# Patient Record
Sex: MALE | Race: WHITE | Employment: OTHER
[De-identification: names, ages, dates, MRNs, and addresses within clinical notes are randomized per-mention and may not be internally consistent; named-entity substitution may affect disease eponyms.]

---

## 2024-04-07 ENCOUNTER — APPOINTMENT (OUTPATIENT)
Facility: HOSPITAL | Age: 69
End: 2024-04-07
Payer: MEDICARE

## 2024-04-07 ENCOUNTER — HOSPITAL ENCOUNTER (EMERGENCY)
Facility: HOSPITAL | Age: 69
Discharge: HOME | End: 2024-04-07
Payer: MEDICARE

## 2024-04-07 VITALS
TEMPERATURE: 97 F | HEIGHT: 67 IN | WEIGHT: 178 LBS | BODY MASS INDEX: 27.94 KG/M2 | RESPIRATION RATE: 22 BRPM | SYSTOLIC BLOOD PRESSURE: 189 MMHG | DIASTOLIC BLOOD PRESSURE: 78 MMHG | OXYGEN SATURATION: 98 % | HEART RATE: 70 BPM

## 2024-04-07 DIAGNOSIS — M19.039 JOINT INFLAMMATION OF WRIST: Primary | ICD-10-CM

## 2024-04-07 DIAGNOSIS — M25.532 LEFT WRIST PAIN: ICD-10-CM

## 2024-04-07 PROCEDURE — 99284 EMERGENCY DEPT VISIT MOD MDM: CPT

## 2024-04-07 PROCEDURE — 96372 THER/PROPH/DIAG INJ SC/IM: CPT

## 2024-04-07 PROCEDURE — 73110 X-RAY EXAM OF WRIST: CPT

## 2024-04-07 PROCEDURE — 6370000000 HC RX 637 (ALT 250 FOR IP): Performed by: PHYSICIAN ASSISTANT

## 2024-04-07 PROCEDURE — 6360000002 HC RX W HCPCS: Performed by: PHYSICIAN ASSISTANT

## 2024-04-07 RX ORDER — MORPHINE SULFATE 10 MG/ML
10 INJECTION, SOLUTION INTRAMUSCULAR; INTRAVENOUS
Status: COMPLETED | OUTPATIENT
Start: 2024-04-07 | End: 2024-04-07

## 2024-04-07 RX ORDER — OXYCODONE HYDROCHLORIDE AND ACETAMINOPHEN 5; 325 MG/1; MG/1
2 TABLET ORAL
Status: COMPLETED | OUTPATIENT
Start: 2024-04-07 | End: 2024-04-07

## 2024-04-07 RX ORDER — KETOROLAC TROMETHAMINE 30 MG/ML
60 INJECTION, SOLUTION INTRAMUSCULAR; INTRAVENOUS
Status: COMPLETED | OUTPATIENT
Start: 2024-04-07 | End: 2024-04-07

## 2024-04-07 RX ORDER — PREDNISONE 50 MG/1
50 TABLET ORAL DAILY
Qty: 5 TABLET | Refills: 0 | Status: SHIPPED | OUTPATIENT
Start: 2024-04-07 | End: 2024-04-12

## 2024-04-07 RX ADMIN — KETOROLAC TROMETHAMINE 60 MG: 60 INJECTION, SOLUTION INTRAMUSCULAR at 15:36

## 2024-04-07 RX ADMIN — MORPHINE SULFATE 10 MG: 10 INJECTION INTRAVENOUS at 17:30

## 2024-04-07 RX ADMIN — PREDNISONE 50 MG: 10 TABLET ORAL at 17:30

## 2024-04-07 RX ADMIN — OXYCODONE HYDROCHLORIDE AND ACETAMINOPHEN 2 TABLET: 5; 325 TABLET ORAL at 15:36

## 2024-04-07 ASSESSMENT — PAIN SCALES - GENERAL
PAINLEVEL_OUTOF10: 10
PAINLEVEL_OUTOF10: 10

## 2024-04-07 NOTE — ED PROVIDER NOTES
EMERGENCY DEPARTMENT HISTORY & PHYSICAL EXAM    [facility] EMERGENCY DEPT  4/7/24, 3:05 PM EDT    Clinical Impression:  1. Joint inflammation of wrist    2. Left wrist pain        Assessment/Differential Diagnosis:     Ddx bony injury, joint injury, inflammatory process, infectious process, vascular process all considered    ED Course:   Initial assessment performed. The patients presenting problems have been discussed, and they are in agreement with the care plan formulated and outlined with them.  I have encouraged them to ask questions as they arise throughout their visit.    Patient comes to the ED with left wrist pain.  Patient states earlier this morning he felt some discomfort within his left wrist.  Patient states while driving his vehicle his pain escalated.  He states at this point he would not be able to drive due to his degree of wrist pain.  He denies any fever, chills or recent illness.  There is been no trauma or overuse.  He does not recall similar pain.  He denies history of gout.  He states the pain is a 10/10 and radiates to his left elbow but initially was just in his wrist.  Pain is much worse with any movement at his wrist, or palpation to the area.  Patient denies any weakness or numbness.  Patient denies fever, chills, headache, neck pain or stiffness.  No pain at the shoulder or elbow.  He is right-hand dominant.  Patient does have history of hypertension, GERD and does take lisinopril, omeprazole and baby aspirin daily.  Patient does have history of arthritis.  Patient does take 2 Percocet twice a day chronically for his arthritic pain.    Exam with older gentleman holding his left wrist gingerly.  He appears uncomfortable but nontoxic.  Initial vitals with elevated blood pressure, afebrile.  Heart regular rate and rhythm without murmur.  Lungs are clear to auscultation.  Neck is supple with normal range of motion, no pain with palpation.  Patient able to

## 2024-04-07 NOTE — ED TRIAGE NOTES
Pt c/o L Wrist pain and swelling with pain radiating to L elbow that started today after a 25min drive from [place]. Pt has a hx of L carpal tunnel repair and hx of arthritis. Pt denies trauma or fall. Pt is hypertensive in triage and appears uncomfortable with moaning, grimacing, and guarding noted.

## 2024-12-18 ENCOUNTER — ANESTHESIA EVENT (OUTPATIENT)
Facility: HOSPITAL | Age: 69
End: 2024-12-18
Payer: MEDICARE

## 2024-12-18 RX ORDER — SODIUM CHLORIDE, SODIUM LACTATE, POTASSIUM CHLORIDE, CALCIUM CHLORIDE 600; 310; 30; 20 MG/100ML; MG/100ML; MG/100ML; MG/100ML
INJECTION, SOLUTION INTRAVENOUS CONTINUOUS
Status: CANCELLED | OUTPATIENT
Start: 2024-12-18

## 2024-12-18 RX ORDER — SODIUM CHLORIDE 0.9 % (FLUSH) 0.9 %
5-40 SYRINGE (ML) INJECTION EVERY 12 HOURS SCHEDULED
Status: CANCELLED | OUTPATIENT
Start: 2024-12-18

## 2024-12-18 RX ORDER — LABETALOL HYDROCHLORIDE 5 MG/ML
10 INJECTION, SOLUTION INTRAVENOUS
Status: CANCELLED | OUTPATIENT
Start: 2024-12-18

## 2024-12-18 RX ORDER — IPRATROPIUM BROMIDE AND ALBUTEROL SULFATE 2.5; .5 MG/3ML; MG/3ML
1 SOLUTION RESPIRATORY (INHALATION)
Status: CANCELLED | OUTPATIENT
Start: 2024-12-18 | End: 2024-12-19

## 2024-12-18 RX ORDER — DROPERIDOL 2.5 MG/ML
0.62 INJECTION, SOLUTION INTRAMUSCULAR; INTRAVENOUS
Status: CANCELLED | OUTPATIENT
Start: 2024-12-18 | End: 2024-12-19

## 2024-12-18 RX ORDER — NALOXONE HYDROCHLORIDE 0.4 MG/ML
INJECTION, SOLUTION INTRAMUSCULAR; INTRAVENOUS; SUBCUTANEOUS PRN
Status: CANCELLED | OUTPATIENT
Start: 2024-12-18

## 2024-12-18 RX ORDER — SODIUM CHLORIDE 9 MG/ML
INJECTION, SOLUTION INTRAVENOUS PRN
Status: CANCELLED | OUTPATIENT
Start: 2024-12-18

## 2024-12-18 RX ORDER — DIPHENHYDRAMINE HYDROCHLORIDE 50 MG/ML
12.5 INJECTION INTRAMUSCULAR; INTRAVENOUS
Status: CANCELLED | OUTPATIENT
Start: 2024-12-18 | End: 2024-12-19

## 2024-12-18 RX ORDER — ONDANSETRON 2 MG/ML
4 INJECTION INTRAMUSCULAR; INTRAVENOUS
Status: CANCELLED | OUTPATIENT
Start: 2024-12-18 | End: 2024-12-19

## 2024-12-18 RX ORDER — SODIUM CHLORIDE 0.9 % (FLUSH) 0.9 %
5-40 SYRINGE (ML) INJECTION PRN
Status: CANCELLED | OUTPATIENT
Start: 2024-12-18

## 2024-12-18 ASSESSMENT — LIFESTYLE VARIABLES: SMOKING_STATUS: 1

## 2024-12-18 NOTE — ANESTHESIA PRE PROCEDURE
Department of Anesthesiology  Preprocedure Note       Name:  Kaden Campbell   Age:  69 y.o.  :  1955                                          MRN:  [id number]         Date:  2024      Surgeon: Surgeon(s):  Julio Ochoa MD    Procedure: Procedure(s):  EYE CATARACT EMULSIFICATION INTRAOCULAR LENS IMPLANT- RIGHT EYE \"SPEC POP\"    Medications prior to admission:   Prior to Admission medications    Medication Sig Start Date End Date Taking? Authorizing Provider   sildenafil (REVATIO) 20 MG tablet Take 1 tablet by mouth in the morning and at bedtime    Darcy Park MD   aspirin 81 MG EC tablet Take 1 tablet by mouth daily    Darcy Park MD   omeprazole (PRILOSEC) 20 MG delayed release capsule Take 1 capsule by mouth every morning    Darcy Park MD   tamsulosin (FLOMAX) 0.4 MG capsule Take 1 capsule by mouth every evening    Darcy Park MD   lisinopril (PRINIVIL;ZESTRIL) 40 MG tablet Take 1 tablet by mouth every evening    Darcy Park MD   NONFORMULARY Indications: Memory support vitamin    ProviderDarcy MD   Probiotic Product (PROBIOTIC BLEND PO) Take by mouth every morning    Darcy Park MD   DULoxetine (CYMBALTA) 60 MG extended release capsule Take 1 capsule by mouth every evening    Darcy Park MD   OXYCODONE ER PO Take by mouth in the morning and at bedtime    Darcy Park MD       Current medications:    No current facility-administered medications for this encounter.     Current Outpatient Medications   Medication Sig Dispense Refill    sildenafil (REVATIO) 20 MG tablet Take 1 tablet by mouth in the morning and at bedtime      aspirin 81 MG EC tablet Take 1 tablet by mouth daily      omeprazole (PRILOSEC) 20 MG delayed release capsule Take 1 capsule by mouth every morning      tamsulosin (FLOMAX) 0.4 MG capsule Take 1 capsule by mouth every evening      lisinopril (PRINIVIL;ZESTRIL) 40 MG tablet Take 1 tablet

## 2024-12-19 ENCOUNTER — HOSPITAL ENCOUNTER (OUTPATIENT)
Facility: HOSPITAL | Age: 69
Setting detail: OUTPATIENT SURGERY
Discharge: HOME | End: 2024-12-19
Attending: OPHTHALMOLOGY | Admitting: OPHTHALMOLOGY
Payer: MEDICARE

## 2024-12-19 ENCOUNTER — ANESTHESIA (OUTPATIENT)
Facility: HOSPITAL | Age: 69
End: 2024-12-19
Payer: MEDICARE

## 2024-12-19 VITALS
SYSTOLIC BLOOD PRESSURE: 151 MMHG | RESPIRATION RATE: 15 BRPM | WEIGHT: 163.7 LBS | OXYGEN SATURATION: 98 % | TEMPERATURE: 97.9 F | DIASTOLIC BLOOD PRESSURE: 72 MMHG | BODY MASS INDEX: 24.81 KG/M2 | HEART RATE: 62 BPM | HEIGHT: 68 IN

## 2024-12-19 PROCEDURE — 7100000011 HC PHASE II RECOVERY - ADDTL 15 MIN: Performed by: OPHTHALMOLOGY

## 2024-12-19 PROCEDURE — 7100000010 HC PHASE II RECOVERY - FIRST 15 MIN: Performed by: OPHTHALMOLOGY

## 2024-12-19 PROCEDURE — 6370000000 HC RX 637 (ALT 250 FOR IP): Performed by: OPHTHALMOLOGY

## 2024-12-19 PROCEDURE — 2720000010 HC SURG SUPPLY STERILE: Performed by: OPHTHALMOLOGY

## 2024-12-19 PROCEDURE — V2632 POST CHMBR INTRAOCULAR LENS: HCPCS | Performed by: OPHTHALMOLOGY

## 2024-12-19 PROCEDURE — 2709999900 HC NON-CHARGEABLE SUPPLY: Performed by: OPHTHALMOLOGY

## 2024-12-19 PROCEDURE — 3700000000 HC ANESTHESIA ATTENDED CARE: Performed by: OPHTHALMOLOGY

## 2024-12-19 PROCEDURE — 3600000002 HC SURGERY LEVEL 2 BASE: Performed by: OPHTHALMOLOGY

## 2024-12-19 PROCEDURE — 6360000002 HC RX W HCPCS: Performed by: OPHTHALMOLOGY

## 2024-12-19 PROCEDURE — 3600000012 HC SURGERY LEVEL 2 ADDTL 15MIN: Performed by: OPHTHALMOLOGY

## 2024-12-19 PROCEDURE — 2500000003 HC RX 250 WO HCPCS: Performed by: OPHTHALMOLOGY

## 2024-12-19 PROCEDURE — 2580000003 HC RX 258: Performed by: OPHTHALMOLOGY

## 2024-12-19 PROCEDURE — 6360000002 HC RX W HCPCS: Performed by: REGISTERED NURSE

## 2024-12-19 PROCEDURE — 3700000001 HC ADD 15 MINUTES (ANESTHESIA): Performed by: OPHTHALMOLOGY

## 2024-12-19 DEVICE — IMPLANTABLE DEVICE: Type: IMPLANTABLE DEVICE | Site: EYE | Status: FUNCTIONAL

## 2024-12-19 RX ORDER — PHENYLEPHRINE HYDROCHLORIDE 100 MG/ML
1 SOLUTION/ DROPS OPHTHALMIC
Status: COMPLETED | OUTPATIENT
Start: 2024-12-19 | End: 2024-12-19

## 2024-12-19 RX ORDER — PROPOFOL 10 MG/ML
INJECTION, EMULSION INTRAVENOUS
Status: DISCONTINUED | OUTPATIENT
Start: 2024-12-19 | End: 2024-12-19 | Stop reason: SDUPTHER

## 2024-12-19 RX ORDER — SODIUM CHLORIDE, POTASSIUM CHLORIDE, CALCIUM CHLORIDE, MAGNESIUM CHLORIDE, SODIUM ACETATE, AND SODIUM CITRATE 6.4; .75; .48; .3; 3.9; 1.7 MG/ML; MG/ML; MG/ML; MG/ML; MG/ML; MG/ML
SOLUTION IRRIGATION PRN
Status: DISCONTINUED | OUTPATIENT
Start: 2024-12-19 | End: 2024-12-19 | Stop reason: ALTCHOICE

## 2024-12-19 RX ORDER — BUPIVACAINE HYDROCHLORIDE 7.5 MG/ML
INJECTION, SOLUTION EPIDURAL; RETROBULBAR PRN
Status: DISCONTINUED | OUTPATIENT
Start: 2024-12-19 | End: 2024-12-19 | Stop reason: ALTCHOICE

## 2024-12-19 RX ORDER — KETOROLAC TROMETHAMINE 5 MG/ML
1 SOLUTION OPHTHALMIC
Status: COMPLETED | OUTPATIENT
Start: 2024-12-19 | End: 2024-12-19

## 2024-12-19 RX ORDER — TROPICAMIDE 10 MG/ML
1 SOLUTION/ DROPS OPHTHALMIC
Status: COMPLETED | OUTPATIENT
Start: 2024-12-19 | End: 2024-12-19

## 2024-12-19 RX ORDER — OFLOXACIN 3 MG/ML
1 SOLUTION/ DROPS OPHTHALMIC
Status: COMPLETED | OUTPATIENT
Start: 2024-12-19 | End: 2024-12-19

## 2024-12-19 RX ORDER — LIDOCAINE HYDROCHLORIDE 20 MG/ML
INJECTION, SOLUTION EPIDURAL; INFILTRATION; INTRACAUDAL; PERINEURAL PRN
Status: DISCONTINUED | OUTPATIENT
Start: 2024-12-19 | End: 2024-12-19 | Stop reason: ALTCHOICE

## 2024-12-19 RX ORDER — NEOMYCIN SULFATE, POLYMYXIN B SULFATE, BACITRACIN ZINC, HYDROCORTISONE 3.5; 10000; 400; 1 MG/G; [USP'U]/G; [USP'U]/G; MG/G
OINTMENT OPHTHALMIC PRN
Status: DISCONTINUED | OUTPATIENT
Start: 2024-12-19 | End: 2024-12-19 | Stop reason: ALTCHOICE

## 2024-12-19 RX ORDER — PROPARACAINE HYDROCHLORIDE 5 MG/ML
1 SOLUTION/ DROPS OPHTHALMIC
Status: COMPLETED | OUTPATIENT
Start: 2024-12-19 | End: 2024-12-19

## 2024-12-19 RX ORDER — LIDOCAIN/PHENYLEPH/BSS NO.2/PF 1 %-1.5 %
SYRINGE (ML) INTRAOCULAR PRN
Status: DISCONTINUED | OUTPATIENT
Start: 2024-12-19 | End: 2024-12-19 | Stop reason: ALTCHOICE

## 2024-12-19 RX ORDER — LIDOCAINE HYDROCHLORIDE 20 MG/ML
INJECTION, SOLUTION EPIDURAL; INFILTRATION; INTRACAUDAL; PERINEURAL
Status: DISCONTINUED | OUTPATIENT
Start: 2024-12-19 | End: 2024-12-19 | Stop reason: SDUPTHER

## 2024-12-19 RX ORDER — BALANCED SALT SOLUTION 6.4; .75; .48; .3; 3.9; 1.7 MG/ML; MG/ML; MG/ML; MG/ML; MG/ML; MG/ML
SOLUTION OPHTHALMIC PRN
Status: DISCONTINUED | OUTPATIENT
Start: 2024-12-19 | End: 2024-12-19 | Stop reason: ALTCHOICE

## 2024-12-19 RX ORDER — CYCLOPENTOLATE HYDROCHLORIDE 10 MG/ML
1 SOLUTION/ DROPS OPHTHALMIC
Status: COMPLETED | OUTPATIENT
Start: 2024-12-19 | End: 2024-12-19

## 2024-12-19 RX ORDER — SODIUM CHLORIDE 9 MG/ML
INJECTION, SOLUTION INTRAVENOUS CONTINUOUS
Status: DISCONTINUED | OUTPATIENT
Start: 2024-12-19 | End: 2024-12-19 | Stop reason: HOSPADM

## 2024-12-19 RX ORDER — ONDANSETRON 2 MG/ML
INJECTION INTRAMUSCULAR; INTRAVENOUS
Status: DISCONTINUED | OUTPATIENT
Start: 2024-12-19 | End: 2024-12-19 | Stop reason: SDUPTHER

## 2024-12-19 RX ADMIN — PROPOFOL 50 MG: 10 INJECTION, EMULSION INTRAVENOUS at 09:01

## 2024-12-19 RX ADMIN — LIDOCAINE HYDROCHLORIDE 40 MG: 20 INJECTION, SOLUTION EPIDURAL; INFILTRATION; INTRACAUDAL; PERINEURAL at 09:12

## 2024-12-19 RX ADMIN — PROPOFOL 50 MG: 10 INJECTION, EMULSION INTRAVENOUS at 09:03

## 2024-12-19 RX ADMIN — OFLOXACIN 1 DROP: 3 SOLUTION OPHTHALMIC at 08:28

## 2024-12-19 RX ADMIN — CYCLOPENTOLATE HYDROCHLORIDE 1 DROP: 10 SOLUTION OPHTHALMIC at 07:38

## 2024-12-19 RX ADMIN — LIDOCAINE HYDROCHLORIDE 60 MG: 20 INJECTION, SOLUTION EPIDURAL; INFILTRATION; INTRACAUDAL; PERINEURAL at 08:57

## 2024-12-19 RX ADMIN — TROPICAMIDE 1 DROP: 10 SOLUTION/ DROPS OPHTHALMIC at 07:38

## 2024-12-19 RX ADMIN — KETOROLAC TROMETHAMINE 1 DROP: 5 SOLUTION/ DROPS OPHTHALMIC at 07:38

## 2024-12-19 RX ADMIN — KETOROLAC TROMETHAMINE 1 DROP: 5 SOLUTION/ DROPS OPHTHALMIC at 07:33

## 2024-12-19 RX ADMIN — PHENYLEPHRINE HYDROCHLORIDE 1 DROP: 100 SOLUTION/ DROPS OPHTHALMIC at 07:33

## 2024-12-19 RX ADMIN — PROPARACAINE HYDROCHLORIDE 1 DROP: 5 SOLUTION/ DROPS OPHTHALMIC at 07:33

## 2024-12-19 RX ADMIN — PROPARACAINE HYDROCHLORIDE 1 DROP: 5 SOLUTION/ DROPS OPHTHALMIC at 07:38

## 2024-12-19 RX ADMIN — PROPOFOL 50 MG: 10 INJECTION, EMULSION INTRAVENOUS at 09:02

## 2024-12-19 RX ADMIN — OFLOXACIN 1 DROP: 3 SOLUTION OPHTHALMIC at 08:23

## 2024-12-19 RX ADMIN — PHENYLEPHRINE HYDROCHLORIDE 1 DROP: 100 SOLUTION/ DROPS OPHTHALMIC at 07:38

## 2024-12-19 RX ADMIN — TROPICAMIDE 1 DROP: 10 SOLUTION/ DROPS OPHTHALMIC at 07:33

## 2024-12-19 RX ADMIN — CYCLOPENTOLATE HYDROCHLORIDE 1 DROP: 10 SOLUTION OPHTHALMIC at 07:33

## 2024-12-19 RX ADMIN — SODIUM CHLORIDE: 9 INJECTION, SOLUTION INTRAVENOUS at 07:46

## 2024-12-19 RX ADMIN — ONDANSETRON HYDROCHLORIDE 4 MG: 2 INJECTION INTRAMUSCULAR; INTRAVENOUS at 09:13

## 2024-12-19 ASSESSMENT — PAIN SCALES - GENERAL: PAINLEVEL_OUTOF10: 5

## 2024-12-19 ASSESSMENT — PAIN DESCRIPTION - LOCATION: LOCATION: BACK;HAND

## 2024-12-19 NOTE — OP NOTE
Cataract Surgery Operative Note      Patient:  Kaden Campbell       Sex:    male       DOA: 12/19/2024         YOB: 1955     Age:  69 y.o.        LOS:  LOS: 0 days       Preoperative Diagnosis: Nuclear sclerotic cataract of right eye [H25.11]  Cortical age-related cataract of right eye [H25.011]  Posterior subcapsular polar age-related cataract of right eye [H25.041]    Postoperative Diagnosis:  * No post-op diagnosis entered *    Surgeon: Surgeons and Role:     * Julio Ochoa MD - Primary    Anesthesia:  Monitor Anesthesia Care    Procedure:  Procedure(s):  CATARACT EXTRACTION WITH INTRAOCULAR LENS IMPLANT- RIGHT EYE    Fluids:  80cc NS    Surgical Assistant:  None    Procedure in Detail: The patient was brought to the operating room and sat up.  The correct axes for the toric IOL were marked on the conjuncctivum with a surgical marker and Bright axis guide  The patient was then reclined and given a retrobulbar injection to the right eye consisting of 2% lidocaine plain and 0.75% marcaine.  This accomplished good anesthesia and akinesia of the globe.  A lid speculum was inserted into the eye and the operating microscope was placed in the appropriate position.   Winter scissors and 0.12 pickups were used to create a superior conjunctival peritomy.  Hemostasis of the episcleral bed was accomplished with the microcautery eraser tip.  A guarded Gl[place]co blade made a groove in the sclera 1-2mm posterior to the blue-gray line.  A keratome was used to enter the anterior chamber.  Viscoelastic was injected to displace aqueous.  A Supersharp blade was used to create a paracentesis tract through clear peripheral cornea at 3:00.  The cystitome was inserted into the eye and used to create a rent in the anterior lens capsule.  The edge of this was grasped with Utrata forceps and dragged around 360 degrees, creating a circular anterior capsulorhexis.  BSS on a cannula was injected under the lip of this to

## 2024-12-19 NOTE — PERIOP NOTE
TRANSFER - IN REPORT:    Verbal report received from NICO Palomino on Kaden Campbell  being received from OR for routine progression of patient care      Report consisted of patient's Situation, Background, Assessment and   Recommendations(SBAR).     Information from the following report(s) Nurse Handoff Report, Intake/Output, and MAR was reviewed with the receiving nurse.    Opportunity for questions and clarification was provided.      Assessment completed upon patient's arrival to unit and care assumed.

## 2024-12-19 NOTE — DISCHARGE INSTRUCTIONS
Guardian/Parent  Date  Discharging Nurse  [facility] [place] [place] [facility]      Name: Kaden Campbell  Julio Polanco MD      : 1955  Post Op Instructions  Phone: [phone]    Diet  Resume usual diet.  Do not drink alcohol beverages, including beer, for 24 hours.    Activity  Do not drive a car or operate any hazardous machinery the day of surgery.  You may resume normal activities as tolerated.  No bending or heavy lifting  You may watch TV and you may read  Bring your eyedrops with you to your appt on *** in [employer].    Wound Care  Anticipate that your eye will tear and water.  You may also experience a sensation that something is in the eye, like sand or grit but this is normal.  Do not touch the affected eye.  Do not remove the eye shield until directed to do so by your physician.    Medications  Take Tylenol Extra Strength 2 tablets by mouth upon arrival home and then every 4 hours as needed for discomfort.  Regarding eye drops:  In most cases, you will not begin using your eye drops in the surgical eye until the day after surgery.  Dr. Polanco will go over all the medications with you at your first Post Op visit.  If your eye is NOT patched after surgery, you will begin using your drops right away.  Specific instructions will be given to you if this is the case.  BRING ALL YOUR EYEDROPS TO YOUR APPOINTMENT.    Notify your physician IMMEDIATELY for any of the following:  Excessive pain not relieved by Tylenol, especially headache or increasing pressure in the Operative eye.  Persistent nausea lasting more than 8 hours.  If any vomiting occurs.    If any questions or concerns arise, call your surgeon at [phone]    I have received a verbal explanation and a written copy of the above instructions.  I am satisfied that all my questions have been answered and I understand these instructions.      _____________________________ _________ ____________________ _________    Signature of

## 2024-12-19 NOTE — H&P
Update History & Physical    The patient's History and Physical of December 19, 2024 was reviewed with the patient and I examined the patient. There was no change. The surgical site was confirmed by the patient and me.       Plan: The risks, benefits, expected outcome, and alternative to the recommended procedure have been discussed with the patient. Patient understands and wants to proceed with the procedure.     Electronically signed by MARVIN DAVIS MD on 12/19/2024 at 8:47 AM

## 2024-12-19 NOTE — PERIOP NOTE
Reviewed PTA medication list with patient/caregiver and patient/caregiver denies any additional medications.     Patient admits to having a responsible adult care for them at home for at least 24 hours after surgery.    Patient encouraged to use gown warming system and informed that using said warming gown to regulate body temperature prior to a procedure has been shown to help reduce the risks of blood clots and infection.    Patient's pharmacy of choice verified and documented in PTA medication section.    Dual skin assessment & fall risk band verification completed with WILL Vargas RN.

## 2024-12-19 NOTE — ANESTHESIA POSTPROCEDURE EVALUATION
Department of Anesthesiology  Postprocedure Note    Patient: Kaden Campbell  MRN: [id number]  YOB: 1955  Date of evaluation: 12/19/2024    Procedure Summary       Date: 12/19/24 Room / Location: [facility] MAIN 02 / [facility] MAIN OR    Anesthesia Start: 0851 Anesthesia Stop: 0930    Procedure: CATARACT EXTRACTION WITH INTRAOCULAR LENS IMPLANT- RIGHT EYE (Right: Eye) Diagnosis:       Nuclear sclerotic cataract of right eye      Cortical age-related cataract of right eye      Posterior subcapsular polar age-related cataract of right eye      (Nuclear sclerotic cataract of right eye [H25.11])      (Cortical age-related cataract of right eye [H25.011])      (Posterior subcapsular polar age-related cataract of right eye [H25.041])    Surgeons: Julio Ochoa MD Responsible Provider: Tre Morris MD    Anesthesia Type: MAC ASA Status: 3            Anesthesia Type: MAC    Jacob Phase I:      Jacob Phase II: Jacob Score: 10    Anesthesia Post Evaluation    Comments: Post-Anesthesia Evaluation and Assessment    Cardiovascular Function/Vital Signs/Pain Score  Vitals  BP: (!) 151/72  Temp: 97.9 °F (36.6 °C)  Temp Source: Temporal  Pulse: 62  Respirations: 15  SpO2: 98 %  Height: 172.7 cm (5' 8\")  Weight - Scale: 74.3 kg (163 lb 11.2 oz)  Pain Level: 5     Patient is status post Procedure(s):  CATARACT EXTRACTION WITH INTRAOCULAR LENS IMPLANT- RIGHT EYE.    Nausea/Vomiting: Controlled.    Postoperative hydration reviewed and adequate.    Pain:  Managed.    Neurological Status:   At baseline.    Mental Status and Level of Consciousness: Arousable.    Pulmonary Status:   Adequate oxygenation and airway patent.    Complications related to anesthesia: None    Post-anesthesia assessment completed. No concerns.    Patient has met all discharge requirements.    Signed By: Tre Morris MD    December 19, 2024         No notable events documented.

## 2025-03-05 PROBLEM — H25.12 NUCLEAR SCLEROSIS, LEFT: Status: ACTIVE | Noted: 2025-03-05

## 2025-03-06 ENCOUNTER — ANESTHESIA EVENT (OUTPATIENT)
Facility: HOSPITAL | Age: 70
End: 2025-03-06
Payer: MEDICARE

## 2025-03-06 ENCOUNTER — HOSPITAL ENCOUNTER (OUTPATIENT)
Facility: HOSPITAL | Age: 70
Setting detail: OUTPATIENT SURGERY
Discharge: HOME | End: 2025-03-06
Attending: OPHTHALMOLOGY | Admitting: OPHTHALMOLOGY
Payer: MEDICARE

## 2025-03-06 ENCOUNTER — ANESTHESIA (OUTPATIENT)
Facility: HOSPITAL | Age: 70
End: 2025-03-06
Payer: MEDICARE

## 2025-03-06 VITALS
TEMPERATURE: 97.9 F | HEIGHT: 68 IN | OXYGEN SATURATION: 97 % | DIASTOLIC BLOOD PRESSURE: 81 MMHG | SYSTOLIC BLOOD PRESSURE: 168 MMHG | BODY MASS INDEX: 25.37 KG/M2 | HEART RATE: 64 BPM | WEIGHT: 167.4 LBS | RESPIRATION RATE: 16 BRPM

## 2025-03-06 PROCEDURE — 2500000003 HC RX 250 WO HCPCS: Performed by: OPHTHALMOLOGY

## 2025-03-06 PROCEDURE — 6370000000 HC RX 637 (ALT 250 FOR IP): Performed by: OPHTHALMOLOGY

## 2025-03-06 PROCEDURE — 2720000010 HC SURG SUPPLY STERILE: Performed by: OPHTHALMOLOGY

## 2025-03-06 PROCEDURE — 3600000002 HC SURGERY LEVEL 2 BASE: Performed by: OPHTHALMOLOGY

## 2025-03-06 PROCEDURE — 3700000000 HC ANESTHESIA ATTENDED CARE: Performed by: OPHTHALMOLOGY

## 2025-03-06 PROCEDURE — 6360000002 HC RX W HCPCS: Performed by: NURSE ANESTHETIST, CERTIFIED REGISTERED

## 2025-03-06 PROCEDURE — 2709999900 HC NON-CHARGEABLE SUPPLY: Performed by: OPHTHALMOLOGY

## 2025-03-06 PROCEDURE — V2632 POST CHMBR INTRAOCULAR LENS: HCPCS | Performed by: OPHTHALMOLOGY

## 2025-03-06 PROCEDURE — 2580000003 HC RX 258: Performed by: OPHTHALMOLOGY

## 2025-03-06 PROCEDURE — 7100000011 HC PHASE II RECOVERY - ADDTL 15 MIN: Performed by: OPHTHALMOLOGY

## 2025-03-06 PROCEDURE — 3700000001 HC ADD 15 MINUTES (ANESTHESIA): Performed by: OPHTHALMOLOGY

## 2025-03-06 PROCEDURE — 7100000010 HC PHASE II RECOVERY - FIRST 15 MIN: Performed by: OPHTHALMOLOGY

## 2025-03-06 PROCEDURE — 6360000002 HC RX W HCPCS: Performed by: OPHTHALMOLOGY

## 2025-03-06 PROCEDURE — 3600000012 HC SURGERY LEVEL 2 ADDTL 15MIN: Performed by: OPHTHALMOLOGY

## 2025-03-06 DEVICE — TECNIS SIMPLICITY TECNIS EYHANCE U 21.5D
Type: IMPLANTABLE DEVICE | Site: EYE | Status: FUNCTIONAL
Brand: TECNIS SIMPLICITY

## 2025-03-06 RX ORDER — SODIUM CHLORIDE 0.9 % (FLUSH) 0.9 %
5-40 SYRINGE (ML) INJECTION EVERY 12 HOURS SCHEDULED
Status: DISCONTINUED | OUTPATIENT
Start: 2025-03-06 | End: 2025-03-06 | Stop reason: HOSPADM

## 2025-03-06 RX ORDER — SODIUM CHLORIDE, POTASSIUM CHLORIDE, CALCIUM CHLORIDE, MAGNESIUM CHLORIDE, SODIUM ACETATE, AND SODIUM CITRATE 6.4; .75; .48; .3; 3.9; 1.7 MG/ML; MG/ML; MG/ML; MG/ML; MG/ML; MG/ML
SOLUTION IRRIGATION PRN
Status: DISCONTINUED | OUTPATIENT
Start: 2025-03-06 | End: 2025-03-06 | Stop reason: ALTCHOICE

## 2025-03-06 RX ORDER — LIDOCAIN/PHENYLEPH/BSS NO.2/PF 1 %-1.5 %
SYRINGE (ML) INTRAOCULAR PRN
Status: DISCONTINUED | OUTPATIENT
Start: 2025-03-06 | End: 2025-03-06 | Stop reason: ALTCHOICE

## 2025-03-06 RX ORDER — PROPARACAINE HYDROCHLORIDE 5 MG/ML
1 SOLUTION/ DROPS OPHTHALMIC ONCE
Status: COMPLETED | OUTPATIENT
Start: 2025-03-06 | End: 2025-03-06

## 2025-03-06 RX ORDER — FENTANYL CITRATE 50 UG/ML
INJECTION, SOLUTION INTRAMUSCULAR; INTRAVENOUS
Status: DISCONTINUED | OUTPATIENT
Start: 2025-03-06 | End: 2025-03-06 | Stop reason: SDUPTHER

## 2025-03-06 RX ORDER — PROPOFOL 10 MG/ML
INJECTION, EMULSION INTRAVENOUS
Status: DISCONTINUED | OUTPATIENT
Start: 2025-03-06 | End: 2025-03-06 | Stop reason: SDUPTHER

## 2025-03-06 RX ORDER — BALANCED SALT SOLUTION 6.4; .75; .48; .3; 3.9; 1.7 MG/ML; MG/ML; MG/ML; MG/ML; MG/ML; MG/ML
SOLUTION OPHTHALMIC PRN
Status: DISCONTINUED | OUTPATIENT
Start: 2025-03-06 | End: 2025-03-06 | Stop reason: ALTCHOICE

## 2025-03-06 RX ORDER — LIDOCAINE HYDROCHLORIDE 20 MG/ML
INJECTION, SOLUTION EPIDURAL; INFILTRATION; INTRACAUDAL; PERINEURAL PRN
Status: DISCONTINUED | OUTPATIENT
Start: 2025-03-06 | End: 2025-03-06 | Stop reason: ALTCHOICE

## 2025-03-06 RX ORDER — PHENYLEPHRINE HYDROCHLORIDE 100 MG/ML
1 SOLUTION/ DROPS OPHTHALMIC
Status: COMPLETED | OUTPATIENT
Start: 2025-03-06 | End: 2025-03-06

## 2025-03-06 RX ORDER — SODIUM CHLORIDE 9 MG/ML
INJECTION, SOLUTION INTRAVENOUS PRN
Status: DISCONTINUED | OUTPATIENT
Start: 2025-03-06 | End: 2025-03-06 | Stop reason: HOSPADM

## 2025-03-06 RX ORDER — OFLOXACIN 3 MG/ML
1 SOLUTION/ DROPS OPHTHALMIC
Status: COMPLETED | OUTPATIENT
Start: 2025-03-06 | End: 2025-03-06

## 2025-03-06 RX ORDER — BUPIVACAINE HYDROCHLORIDE 7.5 MG/ML
INJECTION, SOLUTION EPIDURAL; RETROBULBAR PRN
Status: DISCONTINUED | OUTPATIENT
Start: 2025-03-06 | End: 2025-03-06 | Stop reason: ALTCHOICE

## 2025-03-06 RX ORDER — MIDAZOLAM HYDROCHLORIDE 1 MG/ML
INJECTION, SOLUTION INTRAMUSCULAR; INTRAVENOUS
Status: DISCONTINUED | OUTPATIENT
Start: 2025-03-06 | End: 2025-03-06 | Stop reason: SDUPTHER

## 2025-03-06 RX ORDER — SODIUM CHLORIDE 9 MG/ML
INJECTION, SOLUTION INTRAVENOUS CONTINUOUS
Status: DISCONTINUED | OUTPATIENT
Start: 2025-03-06 | End: 2025-03-06 | Stop reason: HOSPADM

## 2025-03-06 RX ORDER — CYCLOPENTOLATE HYDROCHLORIDE 10 MG/ML
1 SOLUTION/ DROPS OPHTHALMIC
Status: COMPLETED | OUTPATIENT
Start: 2025-03-06 | End: 2025-03-06

## 2025-03-06 RX ORDER — SODIUM CHLORIDE, SODIUM LACTATE, POTASSIUM CHLORIDE, CALCIUM CHLORIDE 600; 310; 30; 20 MG/100ML; MG/100ML; MG/100ML; MG/100ML
INJECTION, SOLUTION INTRAVENOUS CONTINUOUS
Status: DISCONTINUED | OUTPATIENT
Start: 2025-03-06 | End: 2025-03-06 | Stop reason: HOSPADM

## 2025-03-06 RX ORDER — SODIUM CHLORIDE 0.9 % (FLUSH) 0.9 %
5-40 SYRINGE (ML) INJECTION PRN
Status: DISCONTINUED | OUTPATIENT
Start: 2025-03-06 | End: 2025-03-06 | Stop reason: HOSPADM

## 2025-03-06 RX ORDER — LIDOCAINE HYDROCHLORIDE 20 MG/ML
INJECTION, SOLUTION EPIDURAL; INFILTRATION; INTRACAUDAL; PERINEURAL
Status: DISCONTINUED | OUTPATIENT
Start: 2025-03-06 | End: 2025-03-06 | Stop reason: SDUPTHER

## 2025-03-06 RX ORDER — KETOROLAC TROMETHAMINE 5 MG/ML
1 SOLUTION OPHTHALMIC ONCE
Status: COMPLETED | OUTPATIENT
Start: 2025-03-06 | End: 2025-03-06

## 2025-03-06 RX ORDER — TROPICAMIDE 10 MG/ML
1 SOLUTION/ DROPS OPHTHALMIC
Status: COMPLETED | OUTPATIENT
Start: 2025-03-06 | End: 2025-03-06

## 2025-03-06 RX ORDER — NEOMYCIN SULFATE, POLYMYXIN B SULFATE, BACITRACIN ZINC, HYDROCORTISONE 3.5; 10000; 400; 1 MG/G; [USP'U]/G; [USP'U]/G; MG/G
OINTMENT OPHTHALMIC PRN
Status: DISCONTINUED | OUTPATIENT
Start: 2025-03-06 | End: 2025-03-06 | Stop reason: ALTCHOICE

## 2025-03-06 RX ADMIN — FENTANYL CITRATE 50 MCG: 50 INJECTION, SOLUTION INTRAMUSCULAR; INTRAVENOUS at 09:12

## 2025-03-06 RX ADMIN — PROPOFOL 25 MG: 10 INJECTION, EMULSION INTRAVENOUS at 08:56

## 2025-03-06 RX ADMIN — OFLOXACIN 1 DROP: 3 SOLUTION/ DROPS OPHTHALMIC at 07:56

## 2025-03-06 RX ADMIN — PROPOFOL 50 MG: 10 INJECTION, EMULSION INTRAVENOUS at 08:52

## 2025-03-06 RX ADMIN — SODIUM CHLORIDE: 9 INJECTION, SOLUTION INTRAVENOUS at 07:58

## 2025-03-06 RX ADMIN — CYCLOPENTOLATE HYDROCHLORIDE 1 DROP: 10 SOLUTION OPHTHALMIC at 07:56

## 2025-03-06 RX ADMIN — PHENYLEPHRINE HYDROCHLORIDE 1 DROP: 100 SOLUTION/ DROPS OPHTHALMIC at 07:51

## 2025-03-06 RX ADMIN — MIDAZOLAM 2 MG: 1 INJECTION INTRAMUSCULAR; INTRAVENOUS at 08:51

## 2025-03-06 RX ADMIN — PROPARACAINE HYDROCHLORIDE 1 DROP: 5 SOLUTION/ DROPS OPHTHALMIC at 07:52

## 2025-03-06 RX ADMIN — LIDOCAINE HYDROCHLORIDE 100 MG: 20 INJECTION, SOLUTION EPIDURAL; INFILTRATION; INTRACAUDAL; PERINEURAL at 08:51

## 2025-03-06 RX ADMIN — CYCLOPENTOLATE HYDROCHLORIDE 1 DROP: 10 SOLUTION OPHTHALMIC at 07:51

## 2025-03-06 RX ADMIN — TROPICAMIDE 1 DROP: 10 SOLUTION/ DROPS OPHTHALMIC at 07:51

## 2025-03-06 RX ADMIN — OFLOXACIN 1 DROP: 3 SOLUTION/ DROPS OPHTHALMIC at 07:51

## 2025-03-06 RX ADMIN — FENTANYL CITRATE 50 MCG: 50 INJECTION, SOLUTION INTRAMUSCULAR; INTRAVENOUS at 09:09

## 2025-03-06 RX ADMIN — PHENYLEPHRINE HYDROCHLORIDE 1 DROP: 100 SOLUTION/ DROPS OPHTHALMIC at 07:56

## 2025-03-06 RX ADMIN — KETOROLAC TROMETHAMINE 1 DROP: 0.5 SOLUTION OPHTHALMIC at 07:51

## 2025-03-06 RX ADMIN — TROPICAMIDE 1 DROP: 10 SOLUTION/ DROPS OPHTHALMIC at 07:56

## 2025-03-06 RX ADMIN — PROPOFOL 25 MG: 10 INJECTION, EMULSION INTRAVENOUS at 08:54

## 2025-03-06 ASSESSMENT — LIFESTYLE VARIABLES: SMOKING_STATUS: 1

## 2025-03-06 ASSESSMENT — PAIN SCALES - GENERAL
PAINLEVEL_OUTOF10: 4
PAINLEVEL_OUTOF10: 0

## 2025-03-06 ASSESSMENT — PAIN DESCRIPTION - LOCATION: LOCATION: NECK;SHOULDER

## 2025-03-06 NOTE — ANESTHESIA PRE PROCEDURE
0.9 % injection 5-40 mL  5-40 mL IntraVENous PRN Julio Ochoa MD        0.9 % sodium chloride infusion   IntraVENous PRN Julio Ochoa MD        0.9 % sodium chloride infusion   IntraVENous Continuous Julio Ochao  mL/hr at 03/06/25 0758 New Bag at 03/06/25 0758       Allergies:    Allergies   Allergen Reactions    Food Nausea And Vomiting     Scallop    Niacin Itching and Rash       Problem List:    Patient Active Problem List   Diagnosis Code    Primary osteoarthritis of right hip M16.11    Nuclear sclerosis, left H25.12       Past Medical History:        Diagnosis Date    Ambulates with cane 2025    Arthritis 1994    \"all over, OA left shoulder\". hx back surgery in 2008 & 2023    Bilateral carotid artery stenosis 2012    hx carotid endarectomy. saw [facility] Dr. Kenneth Price    BPH (benign prostatic hyperplasia) 2024    on med.    Cataract 2024    hx cataract removal. see Ophthalmologist Dr. Ochoa    Cervical stenosis of spine 2021    hx C3-5 anterior cervical discectomy by surgeon Dr. Crawford    Chronic pain 2008    \"all over\". Polymyalgia Rheumatica. see Neurologist. Dr. Angella Tobar    Dysphagia 12/2021    \"occasionally will choke on food and candy after neck surgery in 2021\" see [facility]    Exercise tolerance finding 02/17/2025    NO excercise. walks with cane. Can go up/down one flight of stairs slowly without SOB/chest pain    Fibromyalgia     on med.    Full dentures     upper and lower    GERD (gastroesophageal reflux disease) 2012    on med    History of TIAs 03/28/2012    no residual. no specialist as of 2/17/25    Hx of small bowel obstruction 11/04/2016    ER visit, notes in [facility]    Hypertension 2004    on med. managed by PCP-Dr. Edilson House    Ulcerative colitis (HCC) 2019    no treatment as of 2/17/2025 due to cost of med.       Past Surgical History:        Procedure Laterality Date    ANTERIOR CERVICAL DISCECTOMY W/ FUSION N/A 12/29/2021    C3-5- Dr. Crawford

## 2025-03-06 NOTE — ANESTHESIA POSTPROCEDURE EVALUATION
Department of Anesthesiology  Postprocedure Note    Patient: Kaden Campbell  MRN: [id number]  YOB: 1955  Date of evaluation: 3/6/2025    Procedure Summary       Date: 03/06/25 Room / Location: [facility] MAIN 02 / [facility] MAIN OR    Anesthesia Start: 0851 Anesthesia Stop: 0931    Procedure: CATARACT EXTRACTION WITH INTRAOCULAR LENS IMPLANT- LEFT EYE (Left: Eye) Diagnosis:       Nuclear sclerotic cataract of left eye      Cortical age-related cataract of left eye      Posterior subcapsular polar age-related cataract of left eye      (Nuclear sclerotic cataract of left eye [H25.12])      (Cortical age-related cataract of left eye [H25.012])      (Posterior subcapsular polar age-related cataract of left eye [H25.042])    Surgeons: Julio Ochoa MD Responsible Provider: Keith Wilkins DO    Anesthesia Type: MAC ASA Status: 3            Anesthesia Type: No value filed.    Jacob Phase I:      Jacob Phase II: Jacob Score: 10    Anesthesia Post Evaluation    Patient location during evaluation: PACU  Patient participation: complete - patient participated  Level of consciousness: awake and alert  Airway patency: patent  Nausea & Vomiting: no nausea and no vomiting  Cardiovascular status: blood pressure returned to baseline  Respiratory status: acceptable  Hydration status: euvolemic  Pain management: adequate    No notable events documented.

## 2025-03-06 NOTE — H&P
Update History & Physical    The patient's History and Physical of March 6, 2025 was reviewed with the patient and I examined the patient. There was no change. The surgical site was confirmed by the patient and me.       Plan: The risks, benefits, expected outcome, and alternative to the recommended procedure have been discussed with the patient. Patient understands and wants to proceed with the procedure.     Electronically signed by MARVIN DAVIS MD on 3/6/2025 at 8:15 AM

## 2025-03-06 NOTE — OP NOTE
Cataract Surgery Operative Note      Patient:  Kaden Capmbell       Sex:    male       DOA: 3/6/2025         YOB: 1955     Age:  69 y.o.        LOS:  LOS: 0 days       Preoperative Diagnosis: Nuclear sclerotic cataract of left eye [H25.12]  Cortical age-related cataract of left eye [H25.012]  Posterior subcapsular polar age-related cataract of left eye [H25.042]    Postoperative Diagnosis:  * No post-op diagnosis entered *    Surgeon: Surgeons and Role:     * Julio Ochoa MD - Primary    Anesthesia:  Monitor Anesthesia Care    Procedure:  Procedure(s):  CATARACT EXTRACTION WITH INTRAOCULAR LENS IMPLANT- LEFT EYE    Fluids:  150 Nonecc LR    Surgical Assistant:  None    Procedure in Detail: The patient was brought to the operating room and given a retrobulbar injection to the left eye consisting of 2% lidocaine plain and 0.75% marcaine.  This accomplished good anesthesia and akinesia of the globe.  A lid speculum was inserted into the eye and the operating microscope was placed in the appropriate position.   Winter scissors and 0.12 pickups were used to create a superior conjunctival peritomy.  Hemostasis of the episcleral bed was accomplished with the microcautery eraser tip.  A guarded [place] blade made a groove in the sclera 1-2mm posterior to the blue-gray line.  A keratome was used to enter the anterior chamber.  Viscoelastic was injected to displace aqueous.  A Supersharp blade was used to create a paracentesis tract through clear peripheral cornea at 3:00.  The cystitome was inserted into the eye and used to create a rent in the anterior lens capsule.  The edge of this was grasped with Utrata forceps and dragged around 360 degrees, creating a circular anterior capsulorhexis.  BSS on a cannula was injected under the lip of this to accomplish hydrodelineation and hydrodissection of the lens.  The phaco tip was inserted into the eye and used to phacoemulsify and aspirate the entire lens  nucleus.  The IA tip was then inserted and used to strip residual lens cortex from the capsular bag.  Viscoelastic was used to reform the capsular bag and the foldable intraocular lens on the injector sleeve was inserted into the eye and injected into the capsular bag.  It was dialed into appropriate position with the Darshan hook.  The IA tip was again inserted and used to remove viscoelastic from the capsular bag around the IOL and from the anterior chamber.    Miochol was injected through the paracentesis tract to accomplish pupillary constriction.  The wound was tested for patency and no leaks were found.  Conjunctivum was reapproximated with cold cautery.  The lid speculum was removed, the drapes brought down.  Maxitrol ointment was instilled over the eye which was closed with an eye patch followed by a Martin shield.  The patient was discharged to the recovery room in good condition.    Estimated Blood Loss: < 1 cc                 Implants:   Implant Name Type Inv. Item Serial No.  Lot No. LRB No. Used Action   LENS IOL TECNIS Regalii [id number] - [id number] Intraocular Lens LENS IOL TECNIS EYHANCE [id number] [facility] [employer]-  Left 1 Implanted       Specimens: * No specimens in log *            Complications:  None

## 2025-03-06 NOTE — DISCHARGE INSTRUCTIONS
[facility]      Name: Kaden [facility]  Julio Polanco MD      : 1955  Post Op Instructions  Phone: [phone]    Diet  Resume usual diet.  Do not drink alcohol beverages, including beer, for 24 hours.    Activity  Do not drive a car or operate any hazardous machinery the day of surgery.  You may resume normal activities as tolerated.  No bending or heavy lifting  You may watch TV and you may read  ***Bring your eyedrops with you to your appt on  @ 9:00 a.m. in [employer].    Wound Care  Anticipate that your eye will tear and water.  You may also experience a sensation that something is in the eye, like sand or grit but this is normal.  Do not touch the affected eye.  Do not remove the eye shield until directed to do so by your physician.    Medications  Take Tylenol Extra Strength 2 tablets by mouth upon arrival home and then every 4 hours as needed for discomfort.  Regarding eye drops:  In most cases, you will not begin using your eye drops in the surgical eye until the day after surgery.  Dr. Polanco will go over all the medications with you at your first Post Op visit.  If your eye is NOT patched after surgery, you will begin using your drops right away.  Specific instructions will be given to you if this is the case.  ***BRING ALL YOUR EYEDROPS TO YOUR APPOINTMENT.    Notify your physician IMMEDIATELY for any of the following:  Excessive pain not relieved by Tylenol, especially headache or increasing pressure in the Operative eye.  Persistent nausea lasting more than 8 hours.  If any vomiting occurs.    If any questions or concerns arise, call your surgeon at [phone]    I have received a verbal explanation and a written copy of the above instructions.  I am satisfied that all my questions have been answered and I understand these instructions.      _____________________________ _________ ____________________ _________  Signature of

## 2025-03-06 NOTE — PERIOP NOTE
Reviewed PTA medication list with patient/caregiver and patient/caregiver denies any additional medications.     Patient admits to having a responsible adult care for them at home for at least 24 hours after surgery.    Patient encouraged to use gown warming system and informed that using said warming gown to regulate body temperature prior to a procedure has been shown to help reduce the risks of blood clots and infection.    Patient's pharmacy of choice verified and documented in PTA medication section.    Dual skin assessment & fall risk band verification completed with GUANAKO Rajan RN.

## (undated) DEVICE — Device

## (undated) DEVICE — PAD,EYE,1-5/8X2 5/8,STERILE,LF,1/PK: Brand: MEDLINE

## (undated) DEVICE — CYTOTOME IRRIG 25GA L16MM ANT CAP BENT

## (undated) DEVICE — SOLUTION IRRIGATION BAL SALT SOLUTION 500 ML STRL BSS

## (undated) DEVICE — CANNULA FRM 27GA 7 8IN

## (undated) DEVICE — 3M™ TEGADERM™ TRANSPARENT FILM DRESSING FRAME STYLE, 1624W, 2-3/8 IN X 2-3/4 IN (6 CM X 7 CM), 100/CT 4CT/CASE: Brand: 3M™ TEGADERM™

## (undated) DEVICE — SYRINGE MED 10ML LUERLOCK TIP W/O SFTY DISP

## (undated) DEVICE — SYSTEM FLD MGMT DIA0.9MM 45DEG ULT BAL VISN ACT INTREPID

## (undated) DEVICE — PROBE HEMSTAT 18GA ERAS BVL TIP STR BPLR WET-FIELD